# Patient Record
Sex: FEMALE | Race: WHITE | URBAN - METROPOLITAN AREA
[De-identification: names, ages, dates, MRNs, and addresses within clinical notes are randomized per-mention and may not be internally consistent; named-entity substitution may affect disease eponyms.]

---

## 2017-05-11 ENCOUNTER — OFFICE VISIT (OUTPATIENT)
Dept: PODIATRY | Facility: CLINIC | Age: 52
End: 2017-05-11
Payer: COMMERCIAL

## 2017-05-11 DIAGNOSIS — M72.2 PLANTAR FASCIITIS, LEFT: ICD-10-CM

## 2017-05-11 DIAGNOSIS — M79.672 BILATERAL FOOT PAIN: Primary | ICD-10-CM

## 2017-05-11 DIAGNOSIS — M79.671 BILATERAL FOOT PAIN: Primary | ICD-10-CM

## 2017-05-11 PROCEDURE — 99213 OFFICE O/P EST LOW 20 MIN: CPT | Performed by: PODIATRIST

## 2017-05-11 NOTE — LETTER
5/11/2017       RE: Lizz Young  [address] [facility] [place] [address]           Dear Colleague,    Thank you for referring your patient, Lizz Young, to the [facility]. Please see a copy of my visit note below.    Lizz returns to the office for reevaluation of the left foot.  The patient relates following the instructions given at the last visit with noted more pain.  The patient relates overall less  improvement in pain and function of the left foot.  The patient relates no other problems.    PAST MEDICAL HISTORY: No past medical history on file.    BMI= There is no height or weight on file to calculate BMI.    Weight management plan: Patient was referred to their PCP to discuss a diet and exercise plan.    Physical Exam:    General: The patient appears to have a pleasant mental affect.    Lower extremity physical exam:  Neurovascular status is intact with palpable pedal pulses and intact epicritic sensations.  Muscular exam is within normal limits to major muscle groups.  Integument is intact.      One notes decreased edema.  One notes pain on palpation on the plantar aspect of the left heel at the insertion point of the plantar fascia. One notes no erythema. One notes a tight gastroc complex on the left.      Assessment:      ICD-10-CM    1. Bilateral foot pain M79.671 XR Foot Bilateral G/E 3 Views    M79.672    2. Plantar fasciitis, left M72.2 ORTHOTICS REFERRAL       Plan:  I have explained to Lizz about the conditions.  At this time, the patient was instructed on icing, stretching, tissue massage and support.  The patient was referred to [facility] for custom orthotics that will aid in offloading the tension forces to the soft tissues and prevent further inflammation.    The patient will return in four weeks for reevaluation if the symptoms do not resolve.      Disclaimer: This note consists of symbols derived from keyboarding, dictation and/or  voice recognition software. As a result, there may be errors in the script that have gone undetected. Please consider this when interpreting information found in this chart.       BERTRAM Feng D.P.M., F.A.C.F.A.S.      Again, thank you for allowing me to participate in the care of your patient.        Sincerely,              Zia Feng DPM

## 2017-05-11 NOTE — PROGRESS NOTES
Lizz returns to the office for reevaluation of the left foot.  The patient relates following the instructions given at the last visit with noted more pain.  The patient relates overall less  improvement in pain and function of the left foot.  The patient relates no other problems.    PAST MEDICAL HISTORY: No past medical history on file.    BMI= There is no height or weight on file to calculate BMI.    Weight management plan: Patient was referred to their PCP to discuss a diet and exercise plan.    Physical Exam:    General: The patient appears to have a pleasant mental affect.    Lower extremity physical exam:  Neurovascular status is intact with palpable pedal pulses and intact epicritic sensations.  Muscular exam is within normal limits to major muscle groups.  Integument is intact.      One notes decreased edema.  One notes pain on palpation on the plantar aspect of the left heel at the insertion point of the plantar fascia. One notes no erythema. One notes a tight gastroc complex on the left.      Assessment:      ICD-10-CM    1. Bilateral foot pain M79.671 XR Foot Bilateral G/E 3 Views    M79.672    2. Plantar fasciitis, left M72.2 ORTHOTICS REFERRAL       Plan:  I have explained to Lizz about the conditions.  At this time, the patient was instructed on icing, stretching, tissue massage and support.  The patient was referred to [facility] for custom orthotics that will aid in offloading the tension forces to the soft tissues and prevent further inflammation.    The patient will return in four weeks for reevaluation if the symptoms do not resolve.      Disclaimer: This note consists of symbols derived from keyboarding, dictation and/or voice recognition software. As a result, there may be errors in the script that have gone undetected. Please consider this when interpreting information found in this chart.       BERTRAM Feng D.P.M., OLIVER.ROSARIO.

## 2017-05-11 NOTE — MR AVS SNAPSHOT
After Visit Summary   5/11/2017    Lizz Young    MRN: [id number]           Patient Information     Date Of Birth          1965        Visit Information        Provider Department      5/11/2017 3:00 PM Zia Feng DPM [facility]        Today's Diagnoses     Bilateral foot pain    -  1    Plantar fasciitis, left          Care Instructions    Initial musculoskeletal treatment recommendation:    1.  Stretch the calf muscles as instructed once an hour.  2.  Ice the injured area in the evening; 20 min on/off.  3.  Take antiinflammatory medication as directed.  4.  Massage the soft tissues around the injured area in the morning to loosen the tissue  5.  Wear supportive foot wear      If no improvement in symptoms within four to six weeks, return to clinic for reevaluation.          Follow-ups after your visit        Additional Services     ORTHOTICS REFERRAL       Please be aware that coverage of these services is subject to the terms and limitations of your health insurance plan.  Call member services at your health plan with any benefit or coverage questions.      Please bring the following to your appointment:    >>   Any x-rays, CTs or MRIs which have been performed.  Contact the facility where they were done to arrange for  prior to your scheduled appointment.  Any new CT, MRI or other procedures ordered by your specialist must be performed at a [facility] facility or coordinated by your clinic's referral office.    >>   List of current medications   >>   This referral request   >>   Any documents/labs given to you for this referral    ==This Referral PRINTS in the [facility] (ORTHOTICS & PROSTHETICS) Central scheduling office ==     The [facility] Central Scheduling staff will contact patient to arrange appointments. Central Scheduling Phone #:  [place]  [phone]     Orthotics: Foot Orthotics                  Follow-up  "notes from your care team     Return in about 4 weeks (around 2017), or if symptoms worsen or fail to improve.      Who to contact     If you have questions or need follow up information about today's clinic visit or your schedule please contact [facility] directly at [phone].  Normal or non-critical lab and imaging results will be communicated to you by MyChart, letter or phone within 4 business days after the clinic has received the results. If you do not hear from us within 7 days, please contact the clinic through MyChart or phone. If you have a critical or abnormal lab result, we will notify you by phone as soon as possible.  Submit refill requests through [employer] or call your pharmacy and they will forward the refill request to us. Please allow 3 business days for your refill to be completed.          Additional Information About Your Visit        MyChart Information     [employer] lets you send messages to your doctor, view your test results, renew your prescriptions, schedule appointments and more. To sign up, go to [email or web address]/[employer] . Click on \"Log in\" on the left side of the screen, which will take you to the Welcome page. Then click on \"Sign up Now\" on the right side of the page.     You will be asked to enter the access code listed below, as well as some personal information. Please follow the directions to create your username and password.     Your access code is: [email or web address]  Expires: 2017  3:24 PM     Your access code will  in 90 days. If you need help or a new code, please call your [facility] clinic or [phone].        Care EveryWhere ID     This is your Care EveryWhere ID. This could be used by other organizations to access your [facility] medical records  [id number]         Blood Pressure from Last 3 Encounters:   10/09/15 136/82   10/02/15 128/86   01/17/15 132/78    Weight from Last 3 Encounters:   10/02/15 98.6 kg (217 lb 6.4 oz) "   12/15/14 99.5 kg (219 lb 6.4 oz)              We Performed the Following     ORTHOTICS REFERRAL        Primary Care Provider    None Specified       No primary provider on file.        Thank you!     Thank you for choosing [facility]  for your care. Our goal is always to provide you with excellent care. Hearing back from our patients is one way we can continue to improve our services. Please take a few minutes to complete the written survey that you may receive in the mail after your visit with us. Thank you!             Your Updated Medication List - Protect others around you: Learn how to safely use, store and throw away your medicines at www.disposemymeds.org.          This list is accurate as of: 5/11/17  3:24 PM.  Always use your most recent med list.                   Brand Name Dispense Instructions for use    hydrochlorothiazide 25 MG tablet    HYDRODIURIL    30 tablet    Take 1 tablet (25 mg) by mouth daily

## 2017-05-11 NOTE — PATIENT INSTRUCTIONS
Initial musculoskeletal treatment recommendation:    1.  Stretch the calf muscles as instructed once an hour.  2.  Ice the injured area in the evening; 20 min on/off.  3.  Take antiinflammatory medication as directed.  4.  Massage the soft tissues around the injured area in the morning to loosen the tissue  5.  Wear supportive foot wear      If no improvement in symptoms within four to six weeks, return to clinic for reevaluation.